# Patient Record
(demographics unavailable — no encounter records)

---

## 2024-10-14 NOTE — PLAN
[FreeTextEntry1] : HIGH FLUE ADMINISTERED TODAY ON LEFT DELTOID, WELL TOLERATED, VIS PROVIDED AS WELL AS POST VACCINE EDUCATION AND PT MERCEDES.UNDERSTANDING.

## 2024-11-26 NOTE — PHYSICAL EXAM
[Well Developed] : well developed [No Acute Distress] : no acute distress [Normal Conjunctiva] : normal conjunctiva [Normal Venous Pressure] : normal venous pressure [No Carotid Bruit] : no carotid bruit [Normal S1, S2] : normal S1, S2 [No Rub] : no rub [S4] : S4 [Clear Lung Fields] : clear lung fields [No Respiratory Distress] : no respiratory distress  [Normal Bowel Sounds] : normal bowel sounds [Normal Gait] : normal gait [No Edema] : no edema [No Rash] : no rash [Moves all extremities] : moves all extremities [No Focal Deficits] : no focal deficits [Normal Speech] : normal speech [Alert and Oriented] : alert and oriented [Normal memory] : normal memory [de-identified] : Borderline obese [de-identified] : I/VI systolic murmur

## 2024-11-26 NOTE — HISTORY OF PRESENT ILLNESS
[FreeTextEntry1] : Mr. Daniel presents today for follow up evaluation. At this time, he denies chest pain, shortness of breath, or other cardiac symptoms. He remains physically active.

## 2024-11-26 NOTE — ASSESSMENT
[FreeTextEntry1] : 1. EKG today demonstrates normal sinus rhythm at 70 bpm. Sinus arrhythmia. Poor R wave progression in Leads V1-V3. Nonspecific ST-T wave changes.   2. Hypertension: Blood pressure is controlled at this time on current medications. A low-salt diet and weight-loss was discussed.   3. Hyperlipidemia: Most recent lipid profile revealed a total cholesterol of 142, HDL 35, LDL 85, triglycerides 122. The patient is advised on a strict low-fat / low-cholesterol diet.   4. Diabetes mellitus: The patient's diabetes remains suboptimally controlled although hemoglobin A1C has dropped from 11.1 earlier this year to 9.6. I had a lengthy conversation with the patient regarding the need for an aggressive low carbohydrate diet as well as follow up with his primary care physician and possibly endocrinology to finetune his medications before he leaves for [place]. Regular aerobic exercise was discussed as well. The patient is welcome to return here on a p.r.n. basis at any time in the future.

## 2024-11-26 NOTE — PHYSICAL EXAM
[Well Developed] : well developed [No Acute Distress] : no acute distress [Normal Conjunctiva] : normal conjunctiva [Normal Venous Pressure] : normal venous pressure [No Carotid Bruit] : no carotid bruit [Normal S1, S2] : normal S1, S2 [No Rub] : no rub [S4] : S4 [Clear Lung Fields] : clear lung fields [No Respiratory Distress] : no respiratory distress  [Normal Bowel Sounds] : normal bowel sounds [Normal Gait] : normal gait [No Edema] : no edema [No Rash] : no rash [Moves all extremities] : moves all extremities [No Focal Deficits] : no focal deficits [Normal Speech] : normal speech [Alert and Oriented] : alert and oriented [Normal memory] : normal memory [de-identified] : Borderline obese [de-identified] : I/VI systolic murmur

## 2024-11-26 NOTE — REASON FOR VISIT
[FreeTextEntry1] : Mr. Daniel is a pleasant 74-year-old  male with a past medical history significant for hypertension, hyperlipidemia, diabetes mellitus, and prostate cancer, who presents for follow up evaluation.

## 2024-12-05 NOTE — DISEASE MANAGEMENT
[1] : T1 [c] : c [0] : M0 [0-10] : 0 -10 ng/mL [Biopsy] : Patient had a biopsy on [7(3+4)] : Template Biopsy [place] Score: 7(3+4) [IIB] : IIB [Radiation Therapy] : Radiation Therapy

## 2024-12-05 NOTE — DISEASE MANAGEMENT
[1] : T1 [c] : c [0] : M0 [0-10] : 0 -10 ng/mL [Biopsy] : Patient had a biopsy on [7(3+4)] : Template Biopsy [place] Score: 7(3+4) [IIB] : IIB [Radiation Therapy] : Radiation Therapy

## 2024-12-06 NOTE — ASSESSMENT
[FreeTextEntry1] : 73-year-old male with past medical history hypertension, hyperlipidemia, prostate CA Jitendra 7 adenocarcinoma of the prostate s/p 8,100cGy for a [id number] [place] 7 completed 9/9/11, recurrent elevated PSA, diabetes type 2 uncontrolled, iron deficiency anemia, presenting to the office for diabetes check and medication refills.  : History of prostate CA [place] 7 adenocarcinoma of the prostate, ed. -Prostate biopsy on October 30/18, pathology shows adenocarcinoma of the prostate, prognostic Grade group 1 [place] score 6 involving 30% of 1 of 1 core. -Will continue follow-up with Radiation oncology Dr. Moises Moe. -Continues with Lupron injections with Dr Booker, last 9/2024 (q4 months) -Patient's urologist is Dr. Alli Booker -PSA 4.74->5.94->5.52-> 6.82 --> 0.17 --> 0.15 -Continue Sildenafil 20 mg  HEME: h/o Iron deficiency anemia. -Hgb 10.5 --> 12.2 -Continue Ferrous sulfate bid  Endocrine: History of diabetes type 2 -A1c 0.5 --> 9.1 --> 8.4 --> 9.3 --> 11.1 --> 10.3 --> 10.3 --> 9.0 POCT today -Currently on 12 units of Lantus at bedtime -Continue Pioglitazone 30 mg taking half of one tablet twice a day, Metformin 1000 mg twice a day, Glimepiride 4 mg twice daily. -Reports fasting sugars between  -Continue diabetic diet and exercise has been recommended. -Ophthalmology Dr Mcconnell, seen 2/24  Cardiovascular: History of hypertension, hyperlipidemia. -Blood pressure controlled. -Continue pravastatin 40 mg once daily, metoprolol succinate  mg in am and 50 mg at bedtime, amlodipine 10 mg once daily, aspirin 81 mg once daily all managed by Cardiology Dr Oden. -Diet and exercise again reinforced with the patient.  Renal: Diabetic nephropathy, CKD stage III, hyperkalemia -Pt follows up with Dr Ladd -Continue Torsemide 10 mg daily by Nephrology.  HCM: -Last colonoscopy performed in April 2016, within normal limits. -Last bone density testing done in AUG 2022, OSTEOPENIA. -Hepatitis C and HIV testing performed in April 2018, both nonreactive. -Diabetic eye exam, 2/24. -Pneumovax 23 vaccine in 2016. -Prevnar 13 in March 2017. -Tetanus vaccine in April 2015. -Shingrix 2/20/24 -Flu vaccine in house 10/24 -Covid vaccination completed MODERNA 3/8/21, 4/5/21, 11/17/21, booster PFIZER 6/3/22 -Spikevax 2/20/24, , Novavax 11/24 -Arexvy VACCINE 5/2024 (RSV vaccine)

## 2024-12-06 NOTE — DATA REVIEWED
[FreeTextEntry1] : 5/17/24: Nuclear stress testing was performed utilizing IV Regadenoson. The patient was noted to have normal pharmacologically induced perfusion without evidence of fixed defects to suggest an antecedent infarction or reversibility suggesting ischemia.   4/15/24: Echocardiography revealed normal left ventricular chamber dimensions and wall motion with a preserved ejection fraction estimated between 55-60%. Mild left ventricular hypertrophy is noted. The left atrium and right-sided chambers reveal normal dimensions and function. Trace aortic insufficiency as well as trace mitral regurgitation are noted. Pulmonary artery pressures are mildly elevated. No other significant findings.

## 2024-12-06 NOTE — HEALTH RISK ASSESSMENT
[No] : In the past 12 months have you used drugs other than those required for medical reasons? No [No falls in past year] : Patient reported no falls in the past year [0] : 2) Feeling down, depressed, or hopeless: Not at all (0) [With Patient/Caregiver] : , with patient/caregiver [Reviewed updated] : Reviewed, updated [Aggressive treatment] : aggressive treatment [Former] : Former [1 or 2 (0 pts)] : 1 or 2 (0 points) [Never (0 pts)] : Never (0 points) [PHQ-2 Negative - No further assessment needed] : PHQ-2 Negative - No further assessment needed [Audit-CScore] : 0 [de-identified] : walking [de-identified] : regular, low carbs [[id number]] : 0 [AdvancecareDate] : 12/24

## 2024-12-06 NOTE — HISTORY OF PRESENT ILLNESS
[FreeTextEntry1] : Diabetes check. [de-identified] : 74-year-old male with past medical history hypertension, nonrheumatic mitral valvular insufficiency. hyperlipidemia, s/p 8,100cGy for a [id number] [place] 7 adenocarcinoma of the prostate completed 9/9/11 followed by Dr Moe, recurrent elevated PSA followed by Dr Booker now on Lupron injections q4m, diabetes type 2 uncontrolled, iron deficiency anemia presenting to the office for diabetes follow up. Pt will be relocating to [place] coming up in January 2025.

## 2024-12-11 NOTE — ASSESSMENT
[No evidence of disease] : No evidence of disease [FreeTextEntry1] : stable, modest treatment related sequelae.

## 2024-12-11 NOTE — REASON FOR VISIT
[Routine Follow-Up] : a routine follow-up visit for prostate cancer [Other: _____] : [unfilled] [Interpreters_IDNumber] : [id number] [Interpreters_FullName] : Jennifer [TWNoteComboBox1] : [place]

## 2024-12-11 NOTE — PHYSICAL EXAM
[Normal] : oriented to person, place and time, the affect was normal, the mood was normal and not anxious [FreeTextEntry1] : deferred [de-identified] : deferred

## 2024-12-11 NOTE — VITALS
[Maximal Pain Intensity: 0/10] : 0/10 [Least Pain Intensity: 0/10] : 0/10 [80: Normal activity with effort; some signs or symptoms of disease.] : 80: Normal activity with effort; some signs or symptoms of disease.  [ECOG Performance Status: 1 - Restricted in physically strenuous activity but ambulatory and able to carry out work of a light or sedentary nature] : Performance Status: 1 - Restricted in physically strenuous activity but ambulatory and able to carry out work of a light or sedentary nature, e.g., light house work, office work [0 - No Distress] : Distress Level: 0

## 2024-12-11 NOTE — REASON FOR VISIT
[Routine Follow-Up] : a routine follow-up visit for prostate cancer [Other: _____] : [unfilled] [Interpreters_IDNumber] : [id number] [Interpreters_FullName] : Jennifer [TWNoteComboBox1] : [place]

## 2024-12-11 NOTE — PHYSICAL EXAM
[Normal] : oriented to person, place and time, the affect was normal, the mood was normal and not anxious [FreeTextEntry1] : deferred [de-identified] : deferred

## 2024-12-11 NOTE — REVIEW OF SYSTEMS
[Nocturia] : nocturia [IPSS Score (0-40): ___] : IPSS score: [unfilled] [EPIC-CP Score (0-60): ___] : EPIC-CP score: [unfilled] [Negative] : Allergic/Immunologic [FreeTextEntry8] : Hx radiation therapy for prostate cancer

## 2024-12-11 NOTE — HISTORY OF PRESENT ILLNESS
[FreeTextEntry1] : This 74-year-old male presents today s/p 8,100 cGy for a [id number] Jitendra 7 adenocarcinoma of the prostate completed 9/9/11. Reports occasional nocturia, ED.  Denies urgency, hematuria, dysuria, urgency, hesitancy, change in bowels, fatigue, pain, or weight loss.  PSA Trend (ng/mL) 8/24/17 1.65  8/14/19: 3.43  10/25/21 0.13  12/23/21 0.17  2/17/22 0.12  5/6/22 0.16  5/18/22 0.15  10/22/22 0.18  10/31/22 0.14  4/8/23 0.15  5/18/24  0.19  11/22/24 0.27

## 2025-01-15 NOTE — HISTORY OF PRESENT ILLNESS
[FreeTextEntry1] : 74-year-old male with HTN, HLD, DM here for f/u visit of ckd. Pt seen and examined; feels well Pt denies any interval change in health. Pt is relocating to [place] with his family end of this month.

## 2025-01-15 NOTE — PHYSICAL EXAM
[General Appearance - Alert] : alert [General Appearance - In No Acute Distress] : in no acute distress [Sclera] : the sclera and conjunctiva were normal [Auscultation Breath Sounds / Voice Sounds] : lungs were clear to auscultation bilaterally [Heart Sounds] : normal S1 and S2 [Edema] : there was no peripheral edema [Abdomen Soft] : soft [No CVA Tenderness] : no ~M costovertebral angle tenderness [Abnormal Walk] : normal gait [] : no rash [No Focal Deficits] : no focal deficits [Oriented To Time, Place, And Person] : oriented to person, place, and time [Impaired Insight] : insight and judgment were intact

## 2025-01-15 NOTE — ASSESSMENT
[FreeTextEntry1] : 73-year-old male pt with HTN, HLD, DM here for f/u visit for discussion of labs and evaluation of renal function.   CKD stage 2 SCr 1.1 mg/dl, eGFR 70 on last labs  Alb/cr ratio previously unable to calculate repeat UA, alb/cr ratuo unclear if pt ever was on ACE-I/ARB- will not start at this time given pt is moving at this time    HTN; bp well controlled continue amlodipine 10, torsemide 10 mg, metoprolol  DM: uncontrolled HbA1c 11.1--> 9.2 on metformin, glimepiride and lantus Encouraged tight glycemic control to halt progression of kidney disease  HLD LDL above goal Continue statin  needs to establish care with nephrology in [place]

## 2025-01-15 NOTE — REASON FOR VISIT
[Follow-Up] : a follow-up visit [FreeTextEntry1] : CKD [Interpreters_IDNumber] : [id number] [Interpreters_FullName] : Meena [TWNoteComboBox1] : [place]